# Patient Record
Sex: FEMALE | Race: WHITE | NOT HISPANIC OR LATINO
[De-identification: names, ages, dates, MRNs, and addresses within clinical notes are randomized per-mention and may not be internally consistent; named-entity substitution may affect disease eponyms.]

---

## 2018-01-23 ENCOUNTER — APPOINTMENT (OUTPATIENT)
Dept: NEUROLOGY | Facility: CLINIC | Age: 53
End: 2018-01-23
Payer: COMMERCIAL

## 2018-01-23 VITALS
BODY MASS INDEX: 31.89 KG/M2 | SYSTOLIC BLOOD PRESSURE: 132 MMHG | DIASTOLIC BLOOD PRESSURE: 80 MMHG | WEIGHT: 180 LBS | HEIGHT: 63 IN

## 2018-01-23 DIAGNOSIS — Z87.891 PERSONAL HISTORY OF NICOTINE DEPENDENCE: ICD-10-CM

## 2018-01-23 PROCEDURE — 99213 OFFICE O/P EST LOW 20 MIN: CPT

## 2018-01-23 RX ORDER — CITALOPRAM HYDROBROMIDE 20 MG/1
20 TABLET, FILM COATED ORAL
Qty: 90 | Refills: 0 | Status: ACTIVE | COMMUNITY
Start: 2017-09-08

## 2019-02-08 ENCOUNTER — APPOINTMENT (OUTPATIENT)
Dept: NEUROLOGY | Facility: CLINIC | Age: 54
End: 2019-02-08
Payer: COMMERCIAL

## 2019-02-08 VITALS
SYSTOLIC BLOOD PRESSURE: 125 MMHG | DIASTOLIC BLOOD PRESSURE: 80 MMHG | WEIGHT: 180 LBS | HEIGHT: 67 IN | BODY MASS INDEX: 28.25 KG/M2

## 2019-02-08 PROCEDURE — 99214 OFFICE O/P EST MOD 30 MIN: CPT

## 2019-02-08 NOTE — REVIEW OF SYSTEMS
[As Noted in HPI] : as noted in HPI [Arm Weakness] : arm weakness [Hand Weakness] :  hand weakness [Leg Weakness] : leg weakness [Negative] : Heme/Lymph

## 2019-02-08 NOTE — CONSULT LETTER
[Dear  ___] : Dear  [unfilled], [Courtesy Letter:] : I had the pleasure of seeing your patient, [unfilled], in my office today. [Please see my note below.] : Please see my note below. [Consult Closing:] : Thank you very much for allowing me to participate in the care of this patient.  If you have any questions, please do not hesitate to contact me. [Sincerely,] : Sincerely, [FreeTextEntry3] : Sher Segovia M.D., Ph.D. DPN-N\par [facility]\par Neurology at [place]\par Medical Director of Stroke Services\par [facility]\par  [DrAlyssa  ___] : Dr. MERCHANT

## 2019-02-08 NOTE — PHYSICAL EXAM
[Person] : oriented to person [Place] : oriented to place [Time] : oriented to time [Remote Intact] : remote memory intact [Concentration Intact] : normal concentrating ability [Visual Intact] : visual attention was ~T not ~L decreased [Naming Objects] : no difficulty naming common objects [Repeating Phrases] : no difficulty repeating a phrase [Writing A Sentence] : no difficulty writing a sentence [Fluency] : fluency intact [Comprehension] : comprehension intact [Reading] : reading intact [Current Events] : adequate knowledge of current events [Past History] : adequate knowledge of personal past history [Cranial Nerves Optic (II)] : visual acuity intact bilaterally,  visual fields full to confrontation, pupils equal round and reactive to light [Cranial Nerves Oculomotor (III)] : extraocular motion intact [Cranial Nerves Trigeminal (V)] : facial sensation intact symmetrically [Cranial Nerves Facial (VII)] : face symmetrical [Cranial Nerves Vestibulocochlear (VIII)] : hearing was intact bilaterally [Cranial Nerves Glossopharyngeal (IX)] : tongue and palate midline [Cranial Nerves Accessory (XI - Cranial And Spinal)] : head turning and shoulder shrug symmetric [Cranial Nerves Hypoglossal (XII)] : there was no tongue deviation with protrusion [Involuntary Movements] : no involuntary movements were seen [Motor Handedness Left-Handed] : the patient is left hand dominant [Hemiparesis Of Left Side] : hemiparesis was present on the left [Paresis Pronator Drift Right-Sided] : no pronator drift on the right [Sensation Tactile Decrease] : light touch was intact [Sensation Pain / Temperature Decrease] : pain and temperature was intact [Sensation Vibration Decrease] : vibration was intact [Proprioception] : proprioception was intact [Balance] : balance was intact [Tremor] : no tremor present [Coordination - Dysmetria Impaired Finger-to-Nose Bilateral] : not present [2+] : Patella right 2+ [3+] : Patella left 3+ [FreeTextEntry6] : worsening left spasticity, left foot inverted, hand D2/3 claw [FreeTextEntry8] : left circumduction, very mild [Sclera] : the sclera and conjunctiva were normal [PERRL With Normal Accommodation] : pupils were equal in size, round, reactive to light, with normal accommodation [Extraocular Movements] : extraocular movements were intact [No APD] : no afferent pupillary defect [No KATY] : no internuclear ophthalmoplegia [Full Visual Field] : full visual field

## 2019-02-08 NOTE — HISTORY OF PRESENT ILLNESS
[FreeTextEntry1] : Followup January 23, 2018:\par This is a 52-year-old woman who comes in today for followup of his stroke. She has not had any significant health problems over the last year. She reports that her cholesterol levels have actually diminished since being seen last at the end of 2016. She continues to have physical and occupational therapy. She is walking much better. She is having some mild contracture in the left hand with atrophy. She is regaining some strength in that hand however. Overall from stroke for review she is stable to slightly improved today for her yearly followup.\par \par Followup very 8 2019:\par This is a 53-year-old woman who presents for followup of stroke. She had a stroke about 6 years ago which left her with left-sided terese-passes. Having more problems currently with spasticity. She continues with physical and occupational therapy and both therapist is suggested that she do something about spasticity to improve her functional abilities. She states that she has been keeping an eye on her lipids and that they're okay. She is here today for a neurologic followup.

## 2019-02-08 NOTE — ASSESSMENT
[FreeTextEntry1] : This is a 53-year-old woman status post stroke. She is doing well in physical therapy and occupational therapy however her spasticity has now begun to impair any further progress. At this point I will send her to Dr. Haas for evaluation for Botox to use for post stroke spasticity. She will continue on antiplatelet agents and statin. She'll continue to refrain from smoking. I will see her in one year, sooner should the need arise.

## 2019-07-17 ENCOUNTER — APPOINTMENT (OUTPATIENT)
Dept: PHYSICAL MEDICINE AND REHAB | Facility: CLINIC | Age: 54
End: 2019-07-17
Payer: COMMERCIAL

## 2019-07-17 VITALS
DIASTOLIC BLOOD PRESSURE: 93 MMHG | BODY MASS INDEX: 28.25 KG/M2 | SYSTOLIC BLOOD PRESSURE: 147 MMHG | HEIGHT: 67 IN | WEIGHT: 180 LBS

## 2019-07-17 PROCEDURE — 99204 OFFICE O/P NEW MOD 45 MIN: CPT | Mod: GC

## 2019-07-17 NOTE — PHYSICAL EXAM
[Normal] : Oriented to person, place, and time, insight and judgement were intact and the affect was normal [de-identified] : AAOx3, NAD, follows commands [de-identified] : spastic gait pattern with extension of the knee and inversion of the foot; pt has difficulty with dorsiflexion of left foot with ambulation 5/5 muscle strength of RUE and RLE; left shoulder abduction 2-MAS, left elbow extension 1+-MAS, left wrist extension 1-MAS, left digit PIP flexion1-MAS, left ankle dorsiflexion 2-MAS,  [de-identified] : sensation intact to light touch b/l UE and LE, CN 2-12 grossly intact

## 2019-07-17 NOTE — ASSESSMENT
[FreeTextEntry1] : 54 yo F with left sided spasticity s/p CVA 6 years ago\par \par Patient will benefit from 200 units of Botox to the left flexor digitorum superficialis, left medial gastrocs, and left soleus muscles for patient to better perform her ADLs including ambulation and improving fine motor coordination. Will need to obtain insurance authorization. \par Patient advised to continue with physical therapy to improve balance, gait, stretching, and strengthening\par Patient advised to continue with her home exercise program.

## 2019-07-17 NOTE — HISTORY OF PRESENT ILLNESS
[FreeTextEntry1] : 52 yo left hand dominant F with PMH of arthritis, HTN, and CVA with left sided residual weakness 6 years ago presents to clinic for evaluation of Botox. Patient states she is currently an independent ambulator with use of SAC. She is able to perform most of her ADLs except washing her face and reaching for objects with her left hand. She is able to dress, bathe, and toilet independently. She states she notes she has tightness in the LUE/LLE compared to the right. She has diffiuclty with ambulation as she has difficulty picking up her left foot when walking. She also notes difficulty with abduction/flexion of the left shoulder. She has some flexion of the PIPs of the left hand. She is currently attending PT 1x week. She was previously using a brace for the left arm but has not stopped due to blisters. She states she is also doing daily stretching exercises for the arm. \par \par PMH: as per above\par PSH: denies\par FH: mother ovarian cancer\par Allergies: seasonal\par SH: denies etoh or drug use; she is currently looking to go back to work

## 2019-07-17 NOTE — REVIEW OF SYSTEMS
[Joint Stiffness] : joint stiffness [Muscle Weakness] : muscle weakness [Difficulty Walking] : difficulty walking [Negative] : Psychiatric [Fever] : no fever [Eye Pain] : no eye pain [Earache] : no earache [Chest Pain] : no chest pain [Shortness Of Breath] : no shortness of breath [Joint Pain] : no joint pain [Muscle Pain] : no muscle pain [Headache] : no headaches [Dizziness] : no dizziness

## 2019-08-05 ENCOUNTER — RX RENEWAL (OUTPATIENT)
Age: 54
End: 2019-08-05

## 2019-08-08 ENCOUNTER — APPOINTMENT (OUTPATIENT)
Dept: PHYSICAL MEDICINE AND REHAB | Facility: CLINIC | Age: 54
End: 2019-08-08

## 2019-10-17 ENCOUNTER — APPOINTMENT (OUTPATIENT)
Dept: PHYSICAL MEDICINE AND REHAB | Facility: CLINIC | Age: 54
End: 2019-10-17

## 2020-02-04 ENCOUNTER — APPOINTMENT (OUTPATIENT)
Dept: NEUROLOGY | Facility: CLINIC | Age: 55
End: 2020-02-04

## 2020-02-10 ENCOUNTER — APPOINTMENT (OUTPATIENT)
Dept: NEUROLOGY | Facility: CLINIC | Age: 55
End: 2020-02-10

## 2020-03-24 ENCOUNTER — APPOINTMENT (OUTPATIENT)
Dept: PHYSICAL MEDICINE AND REHAB | Facility: CLINIC | Age: 55
End: 2020-03-24

## 2020-03-30 ENCOUNTER — APPOINTMENT (OUTPATIENT)
Dept: NEUROLOGY | Facility: CLINIC | Age: 55
End: 2020-03-30

## 2020-05-06 ENCOUNTER — APPOINTMENT (OUTPATIENT)
Dept: PHYSICAL MEDICINE AND REHAB | Facility: CLINIC | Age: 55
End: 2020-05-06

## 2020-06-22 ENCOUNTER — APPOINTMENT (OUTPATIENT)
Dept: NEUROLOGY | Facility: CLINIC | Age: 55
End: 2020-06-22

## 2020-06-29 ENCOUNTER — APPOINTMENT (OUTPATIENT)
Dept: NEUROLOGY | Facility: CLINIC | Age: 55
End: 2020-06-29
Payer: COMMERCIAL

## 2020-07-02 ENCOUNTER — APPOINTMENT (OUTPATIENT)
Dept: PHYSICAL MEDICINE AND REHAB | Facility: CLINIC | Age: 55
End: 2020-07-02
Payer: COMMERCIAL

## 2020-07-02 VITALS
DIASTOLIC BLOOD PRESSURE: 83 MMHG | WEIGHT: 180 LBS | SYSTOLIC BLOOD PRESSURE: 142 MMHG | HEIGHT: 67 IN | BODY MASS INDEX: 28.25 KG/M2 | TEMPERATURE: 98.7 F

## 2020-07-02 DIAGNOSIS — Z78.9 OTHER SPECIFIED HEALTH STATUS: ICD-10-CM

## 2020-07-02 PROCEDURE — 99214 OFFICE O/P EST MOD 30 MIN: CPT

## 2020-07-02 RX ORDER — AMOXICILLIN 500 MG/1
500 CAPSULE ORAL
Qty: 30 | Refills: 0 | Status: COMPLETED | COMMUNITY
Start: 2020-01-30

## 2020-07-02 NOTE — HISTORY OF PRESENT ILLNESS
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 She was see almost a year ago and at that time was recommended chemodenervation, but has missed/cancelled 4 appointments.\par \par Patient noting ongoing left sided tightness, with the leg being worse. She continues to not wear an AFO. Is going to PT in [place] with some improvement. She is currently modified independent with her ADLs and ambulation without a device, but needs more time. \par \par Patient discussed use of oral medications. Discussed that targeted approach would be more functionally restorative and would not compromise fatigue.

## 2020-07-02 NOTE — PHYSICAL EXAM
[Gait - Spastic, Left-Sided] : spastic on the left [Gait - Hemiparetic, Right Side] : not hemiparetic on the right [Gait - Spastic, Right-Sided] : not spastic on the right [Hemispasticity Of Left Side] : spasticity of the left side was present [Hemiparesis Of Left Side] : hemiparesis was present on the left [Monospasticity Of Left Arm] : spasticity of the left arm was present [Gait - Hemiparetic, Left Side] : hemiparetic on the left [Monospasticity Of Left Leg] : spasticity of the left leg was present [Involuntary Movements] : no involuntary movements were seen [Cranial Nerves Optic (II)] : visual acuity and visual fields were intact [Cranial Nerves Vestibulocochlear (VIII)] : hearing was intact [Cranial Nerves Oculomotor (III)] : the extraocular motions were intact [Cranial Nerves Trigeminal (V)] : sensation to the face and masseter strength were intact [Cranial Nerves Hypoglossal (XII)] : there was no tongue deviation with protrusion [Cranial Nerves Accessory (XI - Cranial And Spinal)] : shoulder shrug was intact bilaterally [Cranial Nerves Glossopharyngeal (IX)] : there was normal movement of the soft palate and normal gag [Limited Balance] : the patient's balance was impaired [Sensation Tactile Decrease] : light touch was intact [Mouth Droop On The Left] : left-sided mouth droop [Coordination - Dysmetria Impaired Finger-to-Nose Right Only] : not present on the ride side [Coordination - Dysmetria Impaired Finger-to-Nose Left Only] : present on the left side [___] : [unfilled] ~Ubeats present on the left [Normal] : Oriented to person, place, and time, insight and judgement were intact and the affect was normal [de-identified] : TONE -- Left Bicep - 2/4, Wrist 1/4, Fingers 2/4, HF 2/4, KE 3/4, DF 3/4

## 2020-08-21 ENCOUNTER — APPOINTMENT (OUTPATIENT)
Dept: NEUROLOGY | Facility: CLINIC | Age: 55
End: 2020-08-21
Payer: COMMERCIAL

## 2020-08-21 VITALS — BODY MASS INDEX: 27.92 KG/M2 | WEIGHT: 180 LBS | HEIGHT: 67.5 IN | TEMPERATURE: 98 F

## 2020-08-21 PROCEDURE — 99213 OFFICE O/P EST LOW 20 MIN: CPT

## 2020-08-21 NOTE — ASSESSMENT
[FreeTextEntry1] : This is a 54-year-old woman with history of stroke over 7 years ago. This point her main issue is left-sided spasticity. She needs to for her she can get Botox injections given her current insurance situation. From a neurologic point of view she needs to keep on antiplatelet agents and control her lipids, her goal LDL is under 70. I will see her back in one year, sooner should the need arise.

## 2020-08-21 NOTE — HISTORY OF PRESENT ILLNESS
[FreeTextEntry1] : Followup January 23, 2018:\par This is a 52-year-old woman who comes in today for followup of his stroke. She has not had any significant health problems over the last year. She reports that her cholesterol levels have actually diminished since being seen last at the end of 2016. She continues to have physical and occupational therapy. She is walking much better. She is having some mild contracture in the left hand with atrophy. She is regaining some strength in that hand however. Overall from stroke for review she is stable to slightly improved today for her yearly followup.\par \par Followup February 8 2019:\par This is a 53-year-old woman who presents for followup of stroke. She had a stroke about 6 years ago which left her with left-sided terese-paresis. Having more problems currently with spasticity. She continues with physical and occupational therapy and both therapist is suggested that she do something about spasticity to improve her functional abilities. She states that she has been keeping an eye on her lipids and that they're okay. She is here today for a neurologic followup.\par \par Followup August 21, 2020:\par This is a 54-year-old woman who presents today for followup of stroke. She had her stroke over 7 years ago and has left-sided spastic weakness. She's been evaluated for Botox unfortunately she is not able to get a due to insurance issues. She continues to have pain and discomfort from spasticity at times. She is here today for neurologic followup.

## 2020-08-21 NOTE — REVIEW OF SYSTEMS
[As Noted in HPI] : as noted in HPI [Arm Weakness] : no arm weakness [Hand Weakness] : no hand weakness [Leg Weakness] : no leg weakness [Negative] : Heme/Lymph

## 2020-08-21 NOTE — CONSULT LETTER
[Dear  ___] : Dear  [unfilled], [Please see my note below.] : Please see my note below. [Courtesy Letter:] : I had the pleasure of seeing your patient, [unfilled], in my office today. [FreeTextEntry3] : Sher Segovia M.D., Ph.D. DPN-N\par [facility]\par Neurology at [place]\par Medical Director of Stroke Services\par [facility]\par  [Sincerely,] : Sincerely, [Consult Closing:] : Thank you very much for allowing me to participate in the care of this patient.  If you have any questions, please do not hesitate to contact me.

## 2020-08-21 NOTE — PHYSICAL EXAM
[Person] : oriented to person [Place] : oriented to place [Time] : oriented to time [Remote Intact] : remote memory intact [Span Intact] : the attention span was normal [Concentration Intact] : normal concentrating ability [Visual Intact] : visual attention was ~T not ~L decreased [Naming Objects] : no difficulty naming common objects [Repeating Phrases] : no difficulty repeating a phrase [Writing A Sentence] : no difficulty writing a sentence [Fluency] : fluency intact [Comprehension] : comprehension intact [Reading] : reading intact [Current Events] : adequate knowledge of current events [Cranial Nerves Optic (II)] : visual acuity intact bilaterally,  visual fields full to confrontation, pupils equal round and reactive to light [Past History] : adequate knowledge of personal past history [Cranial Nerves Oculomotor (III)] : extraocular motion intact [Cranial Nerves Trigeminal (V)] : facial sensation intact symmetrically [Cranial Nerves Facial (VII)] : face symmetrical [Cranial Nerves Vestibulocochlear (VIII)] : hearing was intact bilaterally [Cranial Nerves Glossopharyngeal (IX)] : tongue and palate midline [Cranial Nerves Accessory (XI - Cranial And Spinal)] : head turning and shoulder shrug symmetric [Cranial Nerves Hypoglossal (XII)] : there was no tongue deviation with protrusion [Involuntary Movements] : no involuntary movements were seen [Motor Handedness Left-Handed] : the patient is left hand dominant [Hemiparesis Of Left Side] : hemiparesis was present on the left [Sensation Pain / Temperature Decrease] : pain and temperature was intact [Paresis Pronator Drift Right-Sided] : no pronator drift on the right [Sensation Tactile Decrease] : light touch was intact [Sensation Vibration Decrease] : vibration was intact [Tremor] : no tremor present [Balance] : balance was intact [Proprioception] : proprioception was intact [Coordination - Dysmetria Impaired Finger-to-Nose Bilateral] : not present [3+] : Patella left 3+ [2+] : Patella right 2+ [FreeTextEntry8] : left circumduction,  [Sclera] : the sclera and conjunctiva were normal [FreeTextEntry6] : worsening left spasticity, left foot inverted, hand D2/3 claw [Extraocular Movements] : extraocular movements were intact [No KATY] : no internuclear ophthalmoplegia [No APD] : no afferent pupillary defect [PERRL With Normal Accommodation] : pupils were equal in size, round, reactive to light, with normal accommodation [Full Visual Field] : full visual field

## 2022-05-02 ENCOUNTER — NON-APPOINTMENT (OUTPATIENT)
Age: 57
End: 2022-05-02

## 2022-05-02 ENCOUNTER — APPOINTMENT (OUTPATIENT)
Dept: NEUROLOGY | Facility: CLINIC | Age: 57
End: 2022-05-02
Payer: COMMERCIAL

## 2022-05-02 VITALS
BODY MASS INDEX: 28.25 KG/M2 | WEIGHT: 180 LBS | HEIGHT: 67 IN | SYSTOLIC BLOOD PRESSURE: 142 MMHG | DIASTOLIC BLOOD PRESSURE: 80 MMHG

## 2022-05-02 DIAGNOSIS — R25.2 CRAMP AND SPASM: ICD-10-CM

## 2022-05-02 PROCEDURE — 99214 OFFICE O/P EST MOD 30 MIN: CPT

## 2022-05-02 RX ORDER — ONABOTULINUMTOXINA 200 [USP'U]/1
200 INJECTION, POWDER, LYOPHILIZED, FOR SOLUTION INTRADERMAL; INTRAMUSCULAR
Qty: 1 | Refills: 0 | Status: DISCONTINUED | OUTPATIENT
Start: 2019-08-05 | End: 2022-05-02

## 2022-05-02 RX ORDER — LISINOPRIL 2.5 MG/1
2.5 TABLET ORAL
Qty: 90 | Refills: 0 | Status: ACTIVE | COMMUNITY
Start: 2017-12-28

## 2022-05-02 NOTE — HISTORY OF PRESENT ILLNESS
[FreeTextEntry1] : Followup January 23, 2018:\par This is a 52-year-old woman who comes in today for followup of his stroke. She has not had any significant health problems over the last year. She reports that her cholesterol levels have actually diminished since being seen last at the end of 2016. She continues to have physical and occupational therapy. She is walking much better. She is having some mild contracture in the left hand with atrophy. She is regaining some strength in that hand however. Overall from stroke for review she is stable to slightly improved today for her yearly followup.\par \par Followup February 8 2019:\par This is a 53-year-old woman who presents for followup of stroke. She had a stroke about 6 years ago which left her with left-sided terese-paresis. Having more problems currently with spasticity. She continues with physical and occupational therapy and both therapist is suggested that she do something about spasticity to improve her functional abilities. She states that she has been keeping an eye on her lipids and that they're okay. She is here today for a neurologic followup.\par \par Followup August 21, 2020:\par This is a 54-year-old woman who presents today for followup of stroke. She had her stroke over 7 years ago and has left-sided spastic weakness. She's been evaluated for Botox unfortunately she is not able to get a due to insurance issues. She continues to have pain and discomfort from spasticity at times. She is here today for neurologic followup.\par \par Follow-up May 2, 2022:\par This is a 56-year-old woman who presents today for follow-up of stroke.  She had a stroke about 9 years ago.  Her risk factors include hypertension and hyperlipidemia.  She is quit smoking following her stroke.  She continues to have a left-sided hemiparesis.  She is otherwise doing well.  She is here today for routine follow-up.

## 2022-05-02 NOTE — PHYSICAL EXAM
[Person] : oriented to person [Place] : oriented to place [Time] : oriented to time [Remote Intact] : remote memory intact [Span Intact] : the attention span was normal [Concentration Intact] : normal concentrating ability [Visual Intact] : visual attention was ~T not ~L decreased [Naming Objects] : no difficulty naming common objects [Repeating Phrases] : no difficulty repeating a phrase [Writing A Sentence] : no difficulty writing a sentence [Fluency] : fluency intact [Comprehension] : comprehension intact [Reading] : reading intact [Current Events] : adequate knowledge of current events [Past History] : adequate knowledge of personal past history [Cranial Nerves Optic (II)] : visual acuity intact bilaterally,  visual fields full to confrontation, pupils equal round and reactive to light [Cranial Nerves Oculomotor (III)] : extraocular motion intact [Cranial Nerves Trigeminal (V)] : facial sensation intact symmetrically [Cranial Nerves Facial (VII)] : face symmetrical [Cranial Nerves Vestibulocochlear (VIII)] : hearing was intact bilaterally [Cranial Nerves Glossopharyngeal (IX)] : tongue and palate midline [Cranial Nerves Accessory (XI - Cranial And Spinal)] : head turning and shoulder shrug symmetric [Cranial Nerves Hypoglossal (XII)] : there was no tongue deviation with protrusion [Involuntary Movements] : no involuntary movements were seen [Motor Handedness Left-Handed] : the patient is left hand dominant [Hemiparesis Of Left Side] : hemiparesis was present on the left [Paresis Pronator Drift Right-Sided] : no pronator drift on the right [Sensation Tactile Decrease] : light touch was intact [Sensation Pain / Temperature Decrease] : pain and temperature was intact [Sensation Vibration Decrease] : vibration was intact [Proprioception] : proprioception was intact [Balance] : balance was intact [Tremor] : no tremor present [Coordination - Dysmetria Impaired Finger-to-Nose Bilateral] : not present [2+] : Patella right 2+ [3+] : Patella left 3+ [FreeTextEntry6] : worsening left spasticity, left foot inverted, hand D2/3 claw [FreeTextEntry8] : left circumduction,  [Sclera] : the sclera and conjunctiva were normal [PERRL With Normal Accommodation] : pupils were equal in size, round, reactive to light, with normal accommodation [Extraocular Movements] : extraocular movements were intact [No APD] : no afferent pupillary defect [No KATY] : no internuclear ophthalmoplegia [Full Visual Field] : full visual field

## 2022-05-02 NOTE — CONSULT LETTER
[Dear  ___] : Dear  [unfilled], [Courtesy Letter:] : I had the pleasure of seeing your patient, [unfilled], in my office today. [Please see my note below.] : Please see my note below. [Consult Closing:] : Thank you very much for allowing me to participate in the care of this patient.  If you have any questions, please do not hesitate to contact me. [Sincerely,] : Sincerely, [FreeTextEntry3] : Sher Segovia M.D., Ph.D. DPN-N\par [facility]\par Neurology at [place]\par Medical Director of Stroke Services\par [facility]\par

## 2022-05-02 NOTE — ASSESSMENT
[FreeTextEntry1] : This is a 56-year-old woman with a stroke about 9 years ago.  This is left her with left hemiparesis and impaired gait.  At this time we discussed medications for spasticity and she will think about it.  Regarding her high blood pressure she should continue to take medication to keep it in a normal range.  Likewise with her hyperlipidemia she should continue take atorvastatin and have a goal LDL of under 70.  I will see her back in 1 year, sooner should the need arise.

## 2022-07-13 ENCOUNTER — NON-APPOINTMENT (OUTPATIENT)
Age: 57
End: 2022-07-13

## 2022-12-28 ENCOUNTER — APPOINTMENT (OUTPATIENT)
Dept: OBGYN | Facility: CLINIC | Age: 57
End: 2022-12-28

## 2023-01-25 ENCOUNTER — APPOINTMENT (OUTPATIENT)
Dept: NEUROLOGY | Facility: CLINIC | Age: 58
End: 2023-01-25
Payer: COMMERCIAL

## 2023-01-25 VITALS — WEIGHT: 190 LBS | HEIGHT: 67 IN | BODY MASS INDEX: 29.82 KG/M2

## 2023-01-25 PROCEDURE — 99214 OFFICE O/P EST MOD 30 MIN: CPT

## 2023-01-25 NOTE — HISTORY OF PRESENT ILLNESS
[FreeTextEntry1] : Followup January 23, 2018:\par This is a 52-year-old woman who comes in today for followup of his stroke. She has not had any significant health problems over the last year. She reports that her cholesterol levels have actually diminished since being seen last at the end of 2016. She continues to have physical and occupational therapy. She is walking much better. She is having some mild contracture in the left hand with atrophy. She is regaining some strength in that hand however. Overall from stroke for review she is stable to slightly improved today for her yearly followup.\par \par Followup February 8 2019:\par This is a 53-year-old woman who presents for followup of stroke. She had a stroke about 6 years ago which left her with left-sided terese-paresis. Having more problems currently with spasticity. She continues with physical and occupational therapy and both therapist is suggested that she do something about spasticity to improve her functional abilities. She states that she has been keeping an eye on her lipids and that they're okay. She is here today for a neurologic followup.\par \par Followup August 21, 2020:\par This is a 54-year-old woman who presents today for followup of stroke. She had her stroke over 7 years ago and has left-sided spastic weakness. She's been evaluated for Botox unfortunately she is not able to get a due to insurance issues. She continues to have pain and discomfort from spasticity at times. She is here today for neurologic followup.\par \par Follow-up May 2, 2022:\par This is a 56-year-old woman who presents today for follow-up of stroke.  She had a stroke about 9 years ago.  Her risk factors include hypertension and hyperlipidemia.  She is quit smoking following her stroke.  She continues to have a left-sided hemiparesis.  She is otherwise doing well.  She is here today for routine follow-up.\par \par Follow-up January 25, 2023:\par This is a 57-year-old woman who presents today with history of stroke.  She had a stroke about 10 years ago.  She is left with a left spastic hemiparesis.  She has history of high blood pressure and hyperlipidemia for which she takes medication.  She has possible diagnosis of pulmonary fibrosis and requires biopsy.  She is here today I without any new neurologic issues but needing a letter of clearance for planned biopsy.

## 2023-01-25 NOTE — CONSULT LETTER
[Dear  ___] : Dear  [unfilled], [Courtesy Letter:] : I had the pleasure of seeing your patient, [unfilled], in my office today. [Please see my note below.] : Please see my note below. [Consult Closing:] : Thank you very much for allowing me to participate in the care of this patient.  If you have any questions, please do not hesitate to contact me. [Sincerely,] : Sincerely, [FreeTextEntry3] : Sher Segovia M.D., Ph.D. DPN-N\par [facility]\par Neurology at [place]\par Medical Director of Stroke Services\par [facility]\par

## 2023-01-25 NOTE — PHYSICAL EXAM
[Person] : oriented to person [Place] : oriented to place [Time] : oriented to time [Remote Intact] : remote memory intact [Registration Intact] : recent registration memory intact [Span Intact] : the attention span was normal [Concentration Intact] : normal concentrating ability [Visual Intact] : visual attention was ~T not ~L decreased [Naming Objects] : no difficulty naming common objects [Repeating Phrases] : no difficulty repeating a phrase [Writing A Sentence] : no difficulty writing a sentence [Fluency] : fluency intact [Comprehension] : comprehension intact [Reading] : reading intact [Current Events] : adequate knowledge of current events [Past History] : adequate knowledge of personal past history [Cranial Nerves Optic (II)] : visual acuity intact bilaterally,  visual fields full to confrontation, pupils equal round and reactive to light [Cranial Nerves Oculomotor (III)] : extraocular motion intact [Cranial Nerves Trigeminal (V)] : facial sensation intact symmetrically [Cranial Nerves Facial (VII)] : face symmetrical [Cranial Nerves Vestibulocochlear (VIII)] : hearing was intact bilaterally [Cranial Nerves Glossopharyngeal (IX)] : tongue and palate midline [Cranial Nerves Accessory (XI - Cranial And Spinal)] : head turning and shoulder shrug symmetric [Cranial Nerves Hypoglossal (XII)] : there was no tongue deviation with protrusion [Involuntary Movements] : no involuntary movements were seen [Motor Handedness Left-Handed] : the patient is left hand dominant [Hemiparesis Of Left Side] : hemiparesis was present on the left [Paresis Pronator Drift Right-Sided] : no pronator drift on the right [Motor Strength Upper Extremities Right] : strength was normal in the right upper extremity [Motor Strength Upper Extremities Left] : there was weakness of the left upper extremity [Motor Strength Lower Extremities Right] : strength was normal in the right lower extremity [Motor Strength Lower Extremities Left] : there was weakness of the left lower extremity [Sensation Tactile Decrease] : light touch was intact [Sensation Pain / Temperature Decrease] : pain and temperature was intact [Sensation Vibration Decrease] : vibration was intact [Proprioception] : proprioception was intact [Balance] : balance was intact [Tremor] : no tremor present [Coordination - Dysmetria Impaired Finger-to-Nose Bilateral] : not present [2+] : Patella right 2+ [3+] : Patella left 3+ [FreeTextEntry6] : eft spasticity, left foot inverted, hand D2/3 claw [FreeTextEntry8] : left circumduction,  [Sclera] : the sclera and conjunctiva were normal [PERRL With Normal Accommodation] : pupils were equal in size, round, reactive to light, with normal accommodation [Extraocular Movements] : extraocular movements were intact [No APD] : no afferent pupillary defect [No KATY] : no internuclear ophthalmoplegia [Full Visual Field] : full visual field

## 2023-01-25 NOTE — ASSESSMENT
[FreeTextEntry1] : This is a 57-year-old woman with history of stroke about 10 years ago and resulting left hemiparesis.  From a stroke point of view she is stable.  She should continue aspirin as well as blood pressure and lipid control medications.  Regarding plans for upcoming biopsy she would be neurologically cleared.  If the surgeon requires her to be off antiplatelet agent she should be off it for a short period of time as possible.  Clearance letter will be dictated separately for her.  I will see her back at her regularly scheduled appointment in May.

## 2023-01-25 NOTE — REVIEW OF SYSTEMS
[Arm Weakness] : arm weakness [Hand Weakness] :  hand weakness [Leg Weakness] : leg weakness [Difficulty Walking] : difficulty walking [As Noted in HPI] : as noted in HPI [Negative] : Heme/Lymph

## 2023-05-04 ENCOUNTER — APPOINTMENT (OUTPATIENT)
Dept: NEUROLOGY | Facility: CLINIC | Age: 58
End: 2023-05-04
Payer: COMMERCIAL

## 2023-05-04 VITALS
HEIGHT: 67 IN | SYSTOLIC BLOOD PRESSURE: 170 MMHG | BODY MASS INDEX: 29.82 KG/M2 | DIASTOLIC BLOOD PRESSURE: 90 MMHG | WEIGHT: 190 LBS

## 2023-05-04 DIAGNOSIS — E78.5 HYPERLIPIDEMIA, UNSPECIFIED: ICD-10-CM

## 2023-05-04 DIAGNOSIS — I63.9 CEREBRAL INFARCTION, UNSPECIFIED: ICD-10-CM

## 2023-05-04 DIAGNOSIS — I10 ESSENTIAL (PRIMARY) HYPERTENSION: ICD-10-CM

## 2023-05-04 DIAGNOSIS — G81.14 SPASTIC HEMIPLEGIA AFFECTING LEFT NONDOMINANT SIDE: ICD-10-CM

## 2023-05-04 DIAGNOSIS — R26.9 UNSPECIFIED ABNORMALITIES OF GAIT AND MOBILITY: ICD-10-CM

## 2023-05-04 PROCEDURE — 99214 OFFICE O/P EST MOD 30 MIN: CPT

## 2023-05-04 RX ORDER — ATORVASTATIN CALCIUM 20 MG/1
20 TABLET, FILM COATED ORAL
Qty: 90 | Refills: 0 | Status: ACTIVE | COMMUNITY
Start: 2017-12-28

## 2023-05-04 NOTE — CONSULT LETTER
[Dear  ___] : Dear  [unfilled], [Courtesy Letter:] : I had the pleasure of seeing your patient, [unfilled], in my office today. [Please see my note below.] : Please see my note below. [Consult Closing:] : Thank you very much for allowing me to participate in the care of this patient.  If you have any questions, please do not hesitate to contact me. [Sincerely,] : Sincerely, [FreeTextEntry3] : Sher Segovia M.D., Ph.D. DPN-N\par [facility]\par Neurology at [place]\par Medical Director of Stroke Services\par [facility]\par

## 2023-05-04 NOTE — REVIEW OF SYSTEMS
[As Noted in HPI] : as noted in HPI [Facial Weakness] : facial weakness [Arm Weakness] : arm weakness [Hand Weakness] :  hand weakness [Leg Weakness] : leg weakness [Difficulty Walking] : difficulty walking [Negative] : Heme/Lymph

## 2023-05-04 NOTE — HISTORY OF PRESENT ILLNESS
[FreeTextEntry1] : Followup January 23, 2018:\par This is a 52-year-old woman who comes in today for followup of his stroke. She has not had any significant health problems over the last year. She reports that her cholesterol levels have actually diminished since being seen last at the end of 2016. She continues to have physical and occupational therapy. She is walking much better. She is having some mild contracture in the left hand with atrophy. She is regaining some strength in that hand however. Overall from stroke for review she is stable to slightly improved today for her yearly followup.\par \par Followup February 8 2019:\par This is a 53-year-old woman who presents for followup of stroke. She had a stroke about 6 years ago which left her with left-sided terese-paresis. Having more problems currently with spasticity. She continues with physical and occupational therapy and both therapist is suggested that she do something about spasticity to improve her functional abilities. She states that she has been keeping an eye on her lipids and that they're okay. She is here today for a neurologic followup.\par \par Followup August 21, 2020:\par This is a 54-year-old woman who presents today for followup of stroke. She had her stroke over 7 years ago and has left-sided spastic weakness. She's been evaluated for Botox unfortunately she is not able to get a due to insurance issues. She continues to have pain and discomfort from spasticity at times. She is here today for neurologic followup.\par \par Follow-up May 2, 2022:\par This is a 56-year-old woman who presents today for follow-up of stroke.  She had a stroke about 9 years ago.  Her risk factors include hypertension and hyperlipidemia.  She is quit smoking following her stroke.  She continues to have a left-sided hemiparesis.  She is otherwise doing well.  She is here today for routine follow-up.\par \par Follow-up January 25, 2023:\par This is a 57-year-old woman who presents today with history of stroke.  She had a stroke about 10 years ago.  She is left with a left spastic hemiparesis.  She has history of high blood pressure and hyperlipidemia for which she takes medication.  She has possible diagnosis of pulmonary fibrosis and requires biopsy.  She is here today I without any new neurologic issues but needing a letter of clearance for planned biopsy.\par \par Follow-up May 3, 2023:\par This is a 57-year-old woman who presents today with history of stroke.  She has multiple risk factors of hypertension hyperlipidemia.  She takes medication.  Her stroke was about 10 years ago.  She currently has a diagnosis of pulmonary fibrosis she had a closed biopsy which proved inconclusive and now requires open biopsy.  She is here today for neurologic follow-up.

## 2023-05-04 NOTE — PHYSICAL EXAM
[Person] : oriented to person [Place] : oriented to place [Time] : oriented to time [Remote Intact] : remote memory intact [Registration Intact] : recent registration memory intact [Span Intact] : the attention span was normal [Concentration Intact] : normal concentrating ability [Visual Intact] : visual attention was ~T not ~L decreased [Naming Objects] : no difficulty naming common objects [Repeating Phrases] : no difficulty repeating a phrase [Writing A Sentence] : no difficulty writing a sentence [Fluency] : fluency intact [Comprehension] : comprehension intact [Reading] : reading intact [Current Events] : adequate knowledge of current events [Past History] : adequate knowledge of personal past history [Cranial Nerves Optic (II)] : visual acuity intact bilaterally,  visual fields full to confrontation, pupils equal round and reactive to light [Cranial Nerves Oculomotor (III)] : extraocular motion intact [Cranial Nerves Trigeminal (V)] : facial sensation intact symmetrically [Cranial Nerves Facial (VII)] : face symmetrical [Cranial Nerves Vestibulocochlear (VIII)] : hearing was intact bilaterally [Cranial Nerves Glossopharyngeal (IX)] : tongue and palate midline [Cranial Nerves Accessory (XI - Cranial And Spinal)] : head turning and shoulder shrug symmetric [Cranial Nerves Hypoglossal (XII)] : there was no tongue deviation with protrusion [Involuntary Movements] : no involuntary movements were seen [Motor Handedness Left-Handed] : the patient is left hand dominant [Hemiparesis Of Left Side] : hemiparesis was present on the left [Paresis Pronator Drift Right-Sided] : no pronator drift on the right [Motor Strength Upper Extremities Right] : strength was normal in the right upper extremity [Motor Strength Upper Extremities Left] : there was weakness of the left upper extremity [Motor Strength Lower Extremities Right] : strength was normal in the right lower extremity [Motor Strength Lower Extremities Left] : there was weakness of the left lower extremity [Sensation Tactile Decrease] : light touch was intact [Sensation Pain / Temperature Decrease] : pain and temperature was intact [Sensation Vibration Decrease] : vibration was intact [Proprioception] : proprioception was intact [Balance] : balance was intact [Tremor] : no tremor present [Coordination - Dysmetria Impaired Finger-to-Nose Bilateral] : not present [2+] : Patella right 2+ [3+] : Patella left 3+ [FreeTextEntry6] : left spasticity, left foot inverted, hand D2/3 claw [FreeTextEntry8] : left circumduction,  [Sclera] : the sclera and conjunctiva were normal [PERRL With Normal Accommodation] : pupils were equal in size, round, reactive to light, with normal accommodation [Extraocular Movements] : extraocular movements were intact [No APD] : no afferent pupillary defect [No KATY] : no internuclear ophthalmoplegia [Full Visual Field] : full visual field

## 2023-05-04 NOTE — ASSESSMENT
[FreeTextEntry1] : This is a 57-year-old woman with stroke with a left-sided spastic hemiparesis.  She is currently stable.  She will continue to control hypertension hyperlipidemia with medication.  She will remain on aspirin.  She can stop the aspirin prior to her biopsy.  It is preferred that she stop it for as short a period of time as is deemed safe by her surgeon.  I will see her back in 1 year, sooner should the need arise.

## 2025-05-14 ENCOUNTER — APPOINTMENT (OUTPATIENT)
Dept: OBGYN | Facility: CLINIC | Age: 60
End: 2025-05-14

## 2025-05-27 ENCOUNTER — APPOINTMENT (OUTPATIENT)
Dept: OBGYN | Facility: CLINIC | Age: 60
End: 2025-05-27

## 2025-08-20 ENCOUNTER — APPOINTMENT (OUTPATIENT)
Dept: NEUROLOGY | Facility: CLINIC | Age: 60
End: 2025-08-20